# Patient Record
Sex: FEMALE | Employment: UNEMPLOYED | URBAN - METROPOLITAN AREA
[De-identification: names, ages, dates, MRNs, and addresses within clinical notes are randomized per-mention and may not be internally consistent; named-entity substitution may affect disease eponyms.]

---

## 2018-04-16 ENCOUNTER — HOSPITAL ENCOUNTER (INPATIENT)
Age: 23
LOS: 2 days | Discharge: HOME | DRG: 781 | End: 2018-04-18
Attending: PSYCHIATRY & NEUROLOGY | Admitting: STUDENT IN AN ORGANIZED HEALTH CARE EDUCATION/TRAINING PROGRAM
Payer: COMMERCIAL

## 2018-04-16 PROBLEM — F32.A DEPRESSION: Status: ACTIVE | Noted: 2018-04-16

## 2018-04-16 PROCEDURE — 65220000003 HC RM SEMIPRIVATE PSYCH

## 2018-04-16 RX ORDER — HALOPERIDOL 5 MG/ML
2 INJECTION INTRAMUSCULAR
Status: DISCONTINUED | OUTPATIENT
Start: 2018-04-16 | End: 2018-04-18 | Stop reason: HOSPADM

## 2018-04-16 RX ORDER — DIPHENHYDRAMINE HCL 25 MG
25-50 CAPSULE ORAL
Status: DISCONTINUED | OUTPATIENT
Start: 2018-04-16 | End: 2018-04-18 | Stop reason: HOSPADM

## 2018-04-16 RX ORDER — HALOPERIDOL 2 MG/1
2 TABLET ORAL
Status: DISCONTINUED | OUTPATIENT
Start: 2018-04-16 | End: 2018-04-18 | Stop reason: HOSPADM

## 2018-04-16 NOTE — IP AVS SNAPSHOT
Summary of Care Report The Summary of Care report has been created to help improve care coordination. Users with access to [employer] or [address] (Web-based application) may access additional patient information including the Discharge Summary. If you are not currently a [address] user and need more information, please call the number listed below in the [place] [address] section and ask to be connected with Medical Records. Facility Information Name Address Phone [place] [id number] [facility] [address] [phone] Patient Information Patient Name Sex  Diane Monzon ([id number]) Female 1995 Discharge Information Admitting Provider Service Area Unit Danii Rios MD / [address] / [phone] Discharge Provider Discharge Date/Time Discharge Disposition Destination (none) 2018 (Pending) AHR (none) Patient Language Language ENGLISH [13] Hospital Problems as of 2018  Never Reviewed Class Noted - Resolved Last Modified POA Active Problems * (Principal)Adjustment disorder with depressed mood  2018 - Present 2018 by Eliezer Polk MD Yes Entered by Danii Rios MD  
  Cannabis use disorder, moderate, dependence ([address] (Chronic)  2018 - Present 2018 by Eliezer Polk MD Unknown Entered by Eliezer Polk MD  
  
You are allergic to the following No active allergies Current Discharge Medication List  
  
Notice You have not been prescribed any medications. Follow-up Information Follow up With Details Comments Contact Info  
    patient released by the courts. Discharge Instructions BEHAVIORAL HEALTH NURSING DISCHARGE NOTE The following personal items collected during your admission are returned to you:  
Dental Appliance: Dental Appliances: None Vision: Visual Aid: Glasses Hearing Aid:   
Jewelry: Jewelry: Body Piercing ((2) nose ring on pt; (1) nose ring/Locker 107-1) Clothing: Clothing: Pants, Shirt, Undergarments, Socks, Footwear, With patient Other Valuables: Other Valuables: Eyeglasses, Money (comment) ($20.00 Locker 107-1) Valuables sent to safe: Personal Items Sent to Safe: None PATIENT INSTRUCTIONS:  
 
 
The discharge information has been reviewed with the patient. The patient verbalized understanding. Patient armband removed and shredded Chart Review Routing History No Routing History on File

## 2018-04-16 NOTE — IP AVS SNAPSHOT
[address] Patient: Nyasia Rossi MRN: [id number] Emily Wapello About your hospitalization You were admitted on:  April 16, 2018 You last received care in the:  [facility] You were discharged on:  April 18, 2018 Why you were hospitalized Your primary diagnosis was: Adjustment Disorder With Depressed Mood Your diagnoses also included:  Cannabis Use Disorder, Moderate, Dependence ([facility]) Follow-up Information Follow up With Details Comments Contact Info  
    patient released by the courts. Discharge Orders None A check jordyn indicates which time of day the medication should be taken. My Medications Notice You have not been prescribed any medications. Discharge Instructions BEHAVIORAL HEALTH NURSING DISCHARGE NOTE The following personal items collected during your admission are returned to you:  
Dental Appliance: Dental Appliances: None Vision: Visual Aid: Glasses Hearing Aid:   
Jewelry: Jewelry: Body Piercing ((2) nose ring on pt; (1) nose ring/Locker 107-1) Clothing: Clothing: Pants, Shirt, Undergarments, Socks, Footwear, With patient Other Valuables: Other Valuables: Eyeglasses, Money (comment) ($20.00 Locker 107-1) Valuables sent to safe: Personal Items Sent to Safe: None PATIENT INSTRUCTIONS:  
 
 
The discharge information has been reviewed with the patient. The patient verbalized understanding. Patient armband removed and shredded Introducing hospitals & HEALTH SERVICES! Stephanie Godinez introduces [employer] patient portal. Now you can access parts of your medical record, email your doctor's office, and request medication refills online. 1. In your internet browser, go to [email or web address] 2. Click on the First Time User? Click Here link in the Sign In box.  You will see the New Member Sign Up page. 3. Enter your Simplebooklet Access Code exactly as it appears below. You will not need to use this code after youve completed the sign-up process. If you do not sign up before the expiration date, you must request a new code. · Simplebooklet Access Code: [email or web address] Expires: 7/15/2018  7:13 PM 
 
4. Enter the last four digits of your Social Security Number (xxxx) and Date of Birth (mm/dd/yyyy) as indicated and click Submit. You will be taken to the next sign-up page. 5. Create a xoomparkt ID. This will be your Simplebooklet login ID and cannot be changed, so think of one that is secure and easy to remember. 6. Create a Simplebooklet password. You can change your password at any time. 7. Enter your Password Reset Question and Answer. This can be used at a later time if you forget your password. 8. Enter your e-mail address. You will receive e-mail notification when new information is available in [address] 9. Click Sign Up. You can now view and download portions of your medical record. 10. Click the Download Summary menu link to download a portable copy of your medical information. If you have questions, please visit the Frequently Asked Questions section of the Simplebooklet website. Remember, Simplebooklet is NOT to be used for urgent needs. For medical emergencies, dial 911. Now available from your iPhone and Android! Introducing Zhang Parsons As a [employer] patient, I wanted to make you aware of our electronic visit tool called Zhang Sparrowrufinomaddie. [employer] 24/7 allows you to connect within minutes with a medical provider 24 hours a day, seven days a week via a mobile device or tablet or logging into a secure website from your computer. You can access Zhang Parsons from anywhere in the [place].  
 
A virtual visit might be right for you when you have a simple condition and feel like you just dont want to get out of bed, or cant get away from work for an appointment, when your regular [facility] provider is not available (evenings, weekends or holidays), or when youre out of town and need minor care. Electronic visits cost only $49 and if the [facility] 24/7 provider determines a prescription is needed to treat your condition, one can be electronically transmitted to a nearby pharmacy*. Please take a moment to enroll today if you have not already done so. The enrollment process is free and takes just a few minutes. To enroll, please download the [employer] dustin to your tablet or phone, or visit [email or web address] to enroll on your computer. And, as an [address] [place] [address] patient with a [employer] account, the results of your visits will be scanned into your electronic medical record and your primary care provider will be able to view the scanned results. We urge you to continue to see your regular [facility] provider for your ongoing medical care. And while your primary care provider may not be the one available when you seek a 2threadsrufinofin virtual visit, the peace of mind you get from getting a real diagnosis real time can be priceless. For more information on [employer], view our Frequently Asked Questions (FAQs) at [email or web address]. Sincerely, 
 
Denice Patterson MD 
Chief Medical Officer [employer] *:  certain medications cannot be prescribed via [employer] Providers Seen During Your Hospitalization Provider Specialty Primary office phone Gavino Hickman MD Psychiatry [phone] Your Primary Care Physician (PCP) Primary Care Physician Office Phone Office Fax NONE ** None ** ** None ** You are allergic to the following No active allergies Recent Documentation Height Weight BMI  
  
  
 1.803 m 77.1 kg 23.71 kg/m2 Emergency Contacts Name Discharge Info Relation Home Work Mobile Maglais Juárez DISCHARGE CAREGIVER [3] Other Relative [6] [phone] Patient Belongings The following personal items are in your possession at time of discharge: 
  Dental Appliances: None  Visual Aid: Glasses      Home Medications: None   Jewelry: Body Piercing ((2) nose ring on pt; (1) nose ring/Locker 107-1)  Clothing: Pants, Shirt, Undergarments, Socks, Footwear, With patient    Other Valuables: Eyeglasses, Money (comment) ($20.00 Locker 107-1)  Personal Items Sent to Safe: None Please provide this summary of care documentation to your next provider. Signatures-by signing, you are acknowledging that this After Visit Summary has been reviewed with you and you have received a copy. Patient Signature:  ____________________________________________________________ Date:  ____________________________________________________________  
  
[facility] Provider Signature:  ____________________________________________________________ Date:  ____________________________________________________________

## 2018-04-16 NOTE — IP AVS SNAPSHOT
[address] Patient: Sen Gallardo MRN: [id number] Kendra Bryant A check jordyn indicates which time of day the medication should be taken. My Medications Notice You have not been prescribed any medications.

## 2018-04-17 PROBLEM — F43.21 ADJUSTMENT DISORDER WITH DEPRESSED MOOD: Status: ACTIVE | Noted: 2018-04-16

## 2018-04-17 PROBLEM — F12.20 CANNABIS USE DISORDER, MODERATE, DEPENDENCE (HCC): Chronic | Status: ACTIVE | Noted: 2018-04-17

## 2018-04-17 LAB
ALBUMIN SERPL-MCNC: 3.6 G/DL (ref 3.4–5)
ALBUMIN/GLOB SERPL: 1.1 {RATIO} (ref 0.8–1.7)
ALP SERPL-CCNC: 49 U/L (ref 45–117)
ALT SERPL-CCNC: 13 U/L (ref 13–56)
ANION GAP SERPL CALC-SCNC: 4 MMOL/L (ref 3–18)
AST SERPL-CCNC: 8 U/L (ref 15–37)
BASOPHILS # BLD: 0 K/UL (ref 0–0.1)
BASOPHILS NFR BLD: 1 % (ref 0–2)
BILIRUB SERPL-MCNC: 0.5 MG/DL (ref 0.2–1)
BUN SERPL-MCNC: 4 MG/DL (ref 7–18)
BUN/CREAT SERPL: 6 (ref 12–20)
CALCIUM SERPL-MCNC: 8.6 MG/DL (ref 8.5–10.1)
CHLORIDE SERPL-SCNC: 108 MMOL/L (ref 100–108)
CO2 SERPL-SCNC: 27 MMOL/L (ref 21–32)
CREAT SERPL-MCNC: 0.68 MG/DL (ref 0.6–1.3)
DIFFERENTIAL METHOD BLD: ABNORMAL
EOSINOPHIL # BLD: 0.1 K/UL (ref 0–0.4)
EOSINOPHIL NFR BLD: 1 % (ref 0–5)
ERYTHROCYTE [DISTWIDTH] IN BLOOD BY AUTOMATED COUNT: 13.2 % (ref 11.6–14.5)
GLOBULIN SER CALC-MCNC: 3.4 G/DL (ref 2–4)
GLUCOSE SERPL-MCNC: 85 MG/DL (ref 74–99)
HCT VFR BLD AUTO: 34.2 % (ref 35–45)
HGB BLD-MCNC: 11 G/DL (ref 12–16)
LYMPHOCYTES # BLD: 2.5 K/UL (ref 0.9–3.6)
LYMPHOCYTES NFR BLD: 50 % (ref 21–52)
MCH RBC QN AUTO: 27.4 PG (ref 24–34)
MCHC RBC AUTO-ENTMCNC: 32.2 G/DL (ref 31–37)
MCV RBC AUTO: 85.3 FL (ref 74–97)
MONOCYTES # BLD: 0.5 K/UL (ref 0.05–1.2)
MONOCYTES NFR BLD: 10 % (ref 3–10)
NEUTS SEG # BLD: 1.9 K/UL (ref 1.8–8)
NEUTS SEG NFR BLD: 38 % (ref 40–73)
PLATELET # BLD AUTO: 207 K/UL (ref 135–420)
PMV BLD AUTO: 10.3 FL (ref 9.2–11.8)
POTASSIUM SERPL-SCNC: 3.4 MMOL/L (ref 3.5–5.5)
PROT SERPL-MCNC: 7 G/DL (ref 6.4–8.2)
RBC # BLD AUTO: 4.01 M/UL (ref 4.2–5.3)
SODIUM SERPL-SCNC: 139 MMOL/L (ref 136–145)
TSH SERPL DL<=0.05 MIU/L-ACNC: 1.02 UIU/ML (ref 0.36–3.74)
WBC # BLD AUTO: 5 K/UL (ref 4.6–13.2)

## 2018-04-17 PROCEDURE — 84443 ASSAY THYROID STIM HORMONE: CPT | Performed by: STUDENT IN AN ORGANIZED HEALTH CARE EDUCATION/TRAINING PROGRAM

## 2018-04-17 PROCEDURE — 80053 COMPREHEN METABOLIC PANEL: CPT | Performed by: STUDENT IN AN ORGANIZED HEALTH CARE EDUCATION/TRAINING PROGRAM

## 2018-04-17 PROCEDURE — 85025 COMPLETE CBC W/AUTO DIFF WBC: CPT | Performed by: STUDENT IN AN ORGANIZED HEALTH CARE EDUCATION/TRAINING PROGRAM

## 2018-04-17 PROCEDURE — 74011250637 HC RX REV CODE- 250/637: Performed by: STUDENT IN AN ORGANIZED HEALTH CARE EDUCATION/TRAINING PROGRAM

## 2018-04-17 PROCEDURE — 65220000003 HC RM SEMIPRIVATE PSYCH

## 2018-04-17 PROCEDURE — 36415 COLL VENOUS BLD VENIPUNCTURE: CPT | Performed by: STUDENT IN AN ORGANIZED HEALTH CARE EDUCATION/TRAINING PROGRAM

## 2018-04-17 RX ADMIN — PRENATAL VITAMINS-IRON FUMARATE 27 MG IRON-FOLIC ACID 0.8 MG TABLET 1 TABLET: at 08:51

## 2018-04-17 NOTE — BSMART NOTE
Pt.is a 23year old pregnant female with history of Depression. Pt was admitted to this facility for suicidal attempt via overdose. SW Contact: SW met with pt to discuss d/c planning. Pt. admits she overdosed on pills. Pt stated she has been stressed  About being pregnant. Pt stated the father of the baby and her both have talked about a possible . Pt. Stated she is not sure if she wants to have a child at this time in her life. Pt. Stated  she has miscarried in the past.  Pt stated but with this pregnancy , she is not sure she wants a child. Pt. admits she was not thinking clearly the day she overdosed. Pt stated today se is feeling better now that she has been able to talk. Pt. Rell Eliascorie she has suffered from depression since 13years of age. Pt sated she attempted to harm self at that age. Pt. Also admits to  self harm attempt 4 years ago. SW discussed continued safety as a goal of pt. SW also provided pt with mental health and SA education. Pt. Stated she will attend therapy but is not sure about medications at this time. SW will refer pt to  mental health counseling. SW discussed self-efficacy, positive coping skills,safety plan, crisis and suicide hotline in addition to the benefits of counseling. Pt stated she is willing to go to counseling. Pt. Is currently denying ideations at this time. Pt. Has a flat affect, sad and lacks insight.

## 2018-04-17 NOTE — PROGRESS NOTES
Patient seemed depressed, affect flat. States she is ok. Denies suicidal thoughts. Briefly talked about her pregnancy, states \" I'm not going to keep it\". 1:1 offered for emotional support but did not want to talk. Did not wanted to attend group but did express she wanted to talk with doctor.

## 2018-04-17 NOTE — BH NOTES
GROUP THERAPY PROGRESS NOTE    Lyndsay Liu is participating in [place].      Group time: 30 minutes    Personal goal for participation: use the phone    Goal orientation: community    Group therapy participation: minimal    Therapeutic interventions reviewed and discussed: goals and procedures    Impression of participation: encouraged

## 2018-04-17 NOTE — BH NOTES
Patient TDO to unit, escorted by a  and a hospital . Patient alert and oriented, contracted for safety, cooperative with admission process, patient rights was explained and patient verbalized understanding of rights. Patient stated that she understand the reason for further acute treatment due to her ingestion of too many medications for suicide attempt. Patient stated that she has made up her mind to participate in the treatment. Patient was encouraged to participate fully in treatment, verbalize to staff if suicidal thoughts towards self or others arise, she verbalized understanding. Patient received unit orientation and tour, patient remained in hospital gowns, non slip socks provided to patient. Patient ate 100% snacks then returned to her room.  Every 15 minutes rounding for safety continues

## 2018-04-17 NOTE — BSMART NOTE
SOCIAL WORK GROUP THERAPY PROGRESS NOTE    Group Time:  10am    Group Topic:  Coping Skills    Group Participation:      Pt expressed not interested in attending session despite staff support

## 2018-04-17 NOTE — H&P
[address] [facility] [address]  Inpatient Admission Note    Date of Service:  18    Historian(s): Casey Eng and chart review  Referral Source: TDO    Chief Complaint   \"I overdosed on ibuprofen or tylenol. It was only one of them. \"      History of Present Illness     Casey Eng is a 23 y.o.  female with a history of depression and who is currently 4-5 weeks' pregnant who presented for inpatient psychiatric hospitalization under TDO after attempting suicide via ingestion. On initial assessment, the pt states she OD on either Tylenol or ibuprofen on 2018 due to the stress of learning she is pregnant and needing to have an . She notes about 3 years ago, she was pregnant, wanted to keep that child but miscarried. When she learned she was pregnant recently and will likely have an , it engendered memories from her previous pregnancy. The pt became tearful and needed several minutes to gather herself. Today, she reports attempting to OD was a mistake. She reports being glad to be alive. She feels she hurt her friends and family with this overdose attempt. She reports feeling depressed on and off since the age of 5-8yo. Most recently, she denies being depressed prior to this pregnancy. She took Celexa in the past for depression but reports that caused paranoia. She shared protective factors against suicide include work, Caodaism, her mother, friends and her Levy Ramires. External supports include God, her mother, best friends and the father of her unborn child. She denies access to and ownership of weapons. She denies SI, HI and AVH. Psychiatric Review of Systems   Depression:  Endorses depressed mood, with tearfulness and guilty. She denies anhedonia, hopelessness, helplessness and worthlessness. Energy is adequate. Denied any thoughts of self-harm or suicidal ideation.     Anxiety: Denied any excessive worrying, social anxiety, panic attacks and OCD. Irritability: Denied low threshold of frustration or anger. Bipolar symptoms: Denied history of decreased need for sleep associated with increased energy, racing thoughts, rapid speech and risky behavior. Abuse/Trauma/PTSD: Denied history of verbal, physical or sexual abuse. Denies avoidant behavior related to trauma triggers, flashbacks, hypervigilance or nightmares. Psychosis: Denied auditory/visual hallucinations or delusions. Medical Review of Systems     Sleep: stable and adequate  Appetite: stable and adequate    10 point review of systems was completed. Significant findings are found in the HPI or MSE. Psychiatric Treatment History     Self-injurious behavior/risky thoughts or behaviors (past suicidal ideation/attempt): Endorses suicide attempt by overdose 3 years ago when she miscarried her first child. Violence/Risk to others (past homicidal ideation/attempt):    Denies any prior history of violence or homicidal ideation. Previous psychiatric medication trials: Celexa - cause paranoia    Previous psychiatric hospitalizations: Endorses 3 years ago with miscarriage of her first child. Current therapist: Denies   - States she had a therapist in the past but it was not helpful. Current psychiatric provider: Denies    Allergies    No Known Allergies    Medical History     No past medical history on file.     History of neurological illness: Denies  History of head injuries: Denies     Medication(s)     None         Substance Abuse History     Tobacco: denied  Alcohol: denied  Marijuana: smokes marijuana daily  Cocaine: denied  Opiate: denied  Benzodiazepine: denied  Other: denied    Consequences: none    History of detox: none    History of substance abuse treatment: none    Family History     Medical Family History  Maternal: HTN, Cardiac Disease  Paternal: CVA, high cholesterol, and HTN    Psychiatric Family History  Maternal: Depression  Paternal: Denies    Family history of suicide? NO    Social History     Living Situation: Lives alone    Employment: gainfully employed     Education: has one year of college       Relationships/Children: no children, not , not in a relationship    Legal: Denies    Spirituality/Restorationism: Mormonism    Vitals/Labs      Vitals:    04/16/18 1942 04/17/18 0831   BP: 122/83 115/80   Pulse: 93 81   Resp: 19 17   Temp: 98.8 °F (37.1 °C) 98.7 °F (37.1 °C)   Weight: 77.1 kg (170 lb)    Height: 5' 11\" (1.803 m)        Labs:   Results for orders placed or performed during the hospital encounter of 04/16/18   CBC WITH AUTOMATED DIFF   Result Value Ref Range    WBC 5.0 4.6 - 13.2 K/uL    RBC 4.01 (L) 4.20 - 5.30 M/uL    HGB 11.0 (L) 12.0 - 16.0 g/dL    HCT 34.2 (L) 35.0 - 45.0 %    MCV 85.3 74.0 - 97.0 FL    MCH 27.4 24.0 - 34.0 PG    MCHC 32.2 31.0 - 37.0 g/dL    RDW 13.2 11.6 - 14.5 %    PLATELET 923 501 - 970 K/uL    MPV 10.3 9.2 - 11.8 FL    NEUTROPHILS 38 (L) 40 - 73 %    LYMPHOCYTES 50 21 - 52 %    MONOCYTES 10 3 - 10 %    EOSINOPHILS 1 0 - 5 %    BASOPHILS 1 0 - 2 %    ABS. NEUTROPHILS 1.9 1.8 - 8.0 K/UL    ABS. LYMPHOCYTES 2.5 0.9 - 3.6 K/UL    ABS. MONOCYTES 0.5 0.05 - 1.2 K/UL    ABS. EOSINOPHILS 0.1 0.0 - 0.4 K/UL    ABS. BASOPHILS 0.0 0.0 - 0.1 K/UL    DF AUTOMATED     METABOLIC PANEL, COMPREHENSIVE   Result Value Ref Range    Sodium 139 136 - 145 mmol/L    Potassium 3.4 (L) 3.5 - 5.5 mmol/L    Chloride 108 100 - 108 mmol/L    CO2 27 21 - 32 mmol/L    Anion gap 4 3.0 - 18 mmol/L    Glucose 85 74 - 99 mg/dL    BUN 4 (L) 7.0 - 18 MG/DL    Creatinine 0.68 0.6 - 1.3 MG/DL    BUN/Creatinine ratio 6 (L) 12 - 20      GFR est AA >60 >60 ml/min/1.73m2    GFR est non-AA >60 >60 ml/min/1.73m2    Calcium 8.6 8.5 - 10.1 MG/DL    Bilirubin, total 0.5 0.2 - 1.0 MG/DL    ALT (SGPT) 13 13 - 56 U/L    AST (SGOT) 8 (L) 15 - 37 U/L    Alk.  phosphatase 49 45 - 117 U/L    Protein, total 7.0 6.4 - 8.2 g/dL    Albumin 3.6 3.4 - 5.0 g/dL Globulin 3.4 2.0 - 4.0 g/dL    A-G Ratio 1.1 0.8 - 1.7     TSH 3RD GENERATION   Result Value Ref Range    TSH 1.02 0.36 - 3.74 uIU/mL       Mental Status Examination     Appearance/Hygiene 23 y.o. AAF with bright pinkish-red dyed short hair and a nose ring   Behavior/Social Relatedness Appropriate  Relatedness is appropriate   Musculoskeletal Gait/Station: appropriate  Tone (flaccid, cogwheeling, spastic): not assessed  Psychomotor (hyperkinetic, hypokinetic): appropriate   Involuntary movements (tics, dyskinesias, akathisia, stereotypies): none   Speech   Rate, rhythm, volume, fluency and articulation are appropriate   Mood   sad   Affect    sad   Thought Process Linear and goal directed    Vagueness, incoherence, circumstantiality, tangentiality, neologisms, perseveration, flight of ideas, or self-contradictory statements not present on assessment   Thought Content and Perceptual Disturbances Denies delusions, ideas of reference, overvalued ideas, ruminations, obsession, compulsions, and phobias    Denies self-injurious behavior (SIB), suicidal ideation (SI), aggressive behavior or homicidal ideation (HI)    Denies auditory and visual hallucinations   Sensorium and Cognition  AOx4, attention intact, memory intact, language use appropriate, and fund of knowledge age appropriate   Insight  poor   Judgment poor       Suicide Risk Assessment     Admission  Date/Time: 04/17/18    [x] Admission  [] Discharge     Key Factors:   Current admission precipitated by suicide attempt?   [x]  Yes     2    []  No     1     Suicide Attempt History  [x] Past attempts of high lethality    2 []  Past attempts of low lethality    1 []  No previous attempts       0   Suicidal Ideation []  Constant suicidal thoughts      2 []  Intermittent or fleeting suicidal  thoughts  1 [x]  Denies current suicidal thoughts    0   Suicide Plan   []  Has plan with actual OR potential access to planned method    2 []  Has plan without access to planned method      1 [x]  No plan            0   Plan Lethality []  Highly lethal plan (Carbon monoxide, gun, hanging, jumping)    2 []  Moderate lethality of plan          1 [x]  Low lethality of plan (biting, head banging, superficial scratching, pillow over face)  0   Safety Plan Agreement  []  Unwilling OR unable to agree due to impaired reality testing   2   []  Patient is ambivalent and/or guarded      1 [x]  Reliably agrees        0   Current Morbid Thoughts (reunion fantasies, preoccupations with death) []  Constantly     2     []  Frequently    1 [x]  Rarely    0   Elopement Risk  []  High risk     2 []  Moderate risk    1 [x]   Low risk    0   Symptoms    []  Hopeless  []  Helpless  []  Anhedonia   []  Guilt/shame  []  Anger/rage  []  Anxiety  []  Insomnia   []  Agitation   [x]  Impulsivity  []  5-6 symptoms present    2 []  3-4 symptoms present    1  [x]  0-2 symptoms present    0     Total Score: 4  --------------------------------------------------------------------------------------------------------------  Subjective Appraisal of Risk:  []  Patient replies not trustworthy: several non-verbal cues. []  Patient replies questionable: trustworthy: at least 1 non-verbal cue. [x]  Patient replies appear trustworthy.     Protective measures (select all that apply):  [x]  Successful past responses to stress  [x]  Spiritual/Mu-ism beliefs  [x]  Capacity for reality testing  []  Positive therapeutic relationships  [x]  Social supports/connections  []  Positive coping skills  [x]  Frustration tolerance/optimism  []  Children or pets in the home  []  Sense of responsibility to family  [x]  Agrees to treatment plan and follow up    High Risk Diagnoses (select all that apply):  [x]  Depression/Bipolar Disorder  [x]  Dual Diagnosis  []  Cardiovascular Disease  []  Schizophrenia  []  Chronic Pain  []  Epilepsy  []  Cancer  []  Personality Disorder  []  HIV/AIDS  []  Multiple Sclerosis    Dangerousness Assessment (Suicide, homicide, property destruction. ..)    Risk Factors reviewed and risk assessed to be:  [] low  [] low-moderate  [x] moderate   [] moderate-high  [] high     Protection factors reviewed and risk assessed to be:  [x] low  [] low-moderate  [] moderate   [] moderate-high  [] high     Response to treatment and risk assessed to be:  [] low  [x] low-moderate  [] moderate   [] moderate-high  [] high     Support reviewed and risk assessed to be:  [x] low  [] low-moderate  [] moderate   [] moderate-high  [] high     Acceptance of Discharge and outpatient treatment reviewed and risk assessed to be:    [x] low  [] low-moderate  [] moderate   [] moderate-high  [] high   Overall risk assessed to be:  [] low  [x] low-moderate  [] moderate   [] moderate-high  [] high       Assessment and Plan     Psychiatric Diagnoses:   Patient Active Problem List   Diagnosis Code    Adjustment disorder with depressed mood F43.21    Cannabis use disorder, moderate, dependence ([facility] [address] F12.20       Medical Diagnoses: 4-5 weeks' pregnant    Psychosocial and contextual factors:    1. Pregnancy   2. Past miscarriage about 3 years ago. Level of impairment/disability: Severe     Lyndsay Liu is a 23 y. o. who is currently requiring acute stabilization after attempting to kill herself in the context of learning she will have to abort her current pregnancy as she is not prepared for a child. Her current pregnancy caused her to think about her first pregnancy which she wanted to keep but miscarried. Pt requires inpatient psychiatric hospitalization due to recent suicide attempt and development of a safety plan. Pt does not want to take medications at this time due to her past experience of developing paranoia with her Celexa. 1. Admit to locked inpatient behavioral health unit. Start milieu, group, art and occupation therapy. 2. No medications at this time. 3. Consider OB-GYN consult  4.  Routine labs ordered and reviewed by this provider. 5. Reviewed instructions, risks, benefits and side effects. 6. Start disposition planning; verify upcoming outpatient appointments with therapist and/or psychiatric medication prescriber.    7. Tentative date of discharge: 3-5 days       Zion Sandhu MD  [id number]

## 2018-04-17 NOTE — H&P
Psychiatry History and Physical    Subjective:     Date of Evaluation:  4/17/2018    Reason for Referral:  Zo Le was referred to the examiners from /[facility] for OD. History of Presenting Problem: 23Y/O AA female in NAD well developed and nourished. TDO admit. Reports she is not suicidal now. Prior Suicide attempt 3-4 years ago. Medical problems; Depression, 4 weeks pregnant. Records show she took 15 Motrin and 15 Tylenol. Patient Active Problem List    Diagnosis Date Noted    Depression 04/16/2018     No past medical history on file. No past surgical history on file. No family history on file. Social History   Substance Use Topics    Smoking status: Not on file    Smokeless tobacco: Not on file    Alcohol use Not on file     Prior to Admission medications    Not on File     No Known Allergies     Review of Systems - History obtained from chart review and the patient  General ROS: negative  Psychological ROS: positive for - depression  Respiratory ROS: no cough, shortness of breath, or wheezing  Cardiovascular ROS: no chest pain or dyspnea on exertion  Gastrointestinal ROS: no abdominal pain, change in bowel habits, or black or bloody stools  Genito-Urinary ROS: no dysuria, trouble voiding, or hematuria  Musculoskeletal ROS: negative  Neurological ROS: no TIA or stroke symptoms  Dermatological ROS: negative  Has old lac to LUE      Objective:     No data found.       Mental Status exam: WNL except for    Sensorium  oriented to time, place and person   Orientation person, place, time/date and situation   Relations cooperative   Eye Contact appropriate   Appearance:  age appropriate and within normal Limits   Motor Behavior:  gait stable and within normal limits   Speech:  normal volume   Vocabulary average   Thought Process: logical   Thought Content free of delusions and free of hallucinations   Suicidal ideations no plan  and no intention   Homicidal ideations no plan  and no intention   Mood:  depressed   Affect:  depressed   Memory recent  adequate   Memory remote:  adequate   Concentration:  adequate   Abstraction:  concrete   Insight:  good   Reliability good   Judgment:  good         Physical Exam:   Visit Vitals    /83 (BP 1 Location: Right arm, BP Patient Position: Sitting)    Pulse 93    Temp 98.8 °F (37.1 °C)    Resp 19    Ht 5' 11\" (1.803 m)    Wt 77.1 kg (170 lb)    BMI 23.71 kg/m2     General appearance: alert, cooperative, no distress, appears stated age  Head: Normocephalic, without obvious abnormality, atraumatic  Eyes: negative  Ears: normal TM's and external ear canals AU  Nose: Nares normal. Septum midline. Mucosa normal. No drainage or sinus tenderness. Throat: Lips, mucosa, and tongue normal. Teeth and gums normal  Neck: supple, symmetrical, trachea midline, no adenopathy, thyroid: not enlarged, symmetric, no tenderness/mass/nodules, no carotid bruit and no JVD  Back: symmetric, no curvature. ROM normal. No CVA tenderness. Lungs: clear to auscultation bilaterally  Heart: regular rate and rhythm, S1, S2 normal, no murmur, click, rub or gallop  Abdomen: soft, non-tender. Bowel sounds normal. No masses,  no organomegaly  Extremities: extremities normal, atraumatic, no cyanosis or edema  Pulses: 2+ and symmetric  Skin: Skin color, texture, turgor normal. No rashes or lesions or OLD SUPERFICIAL LAC TO LUE  Lymph nodes: Cervical, supraclavicular, and axillary nodes normal.  Neurologic: Grossly normal        Impression:      Active Problems:    Depression (4/16/2018)          Plan:     Recommendations for Treatment/Conditions:  Psychiatric treatment recommended while in hospital  Admit to Psych services for OD                                                  Depression                                                  Pregnant-4 weeks    Referral To: Inpatient psychiatric care    Competency Statement:    It is recommended that the family or other concerned individuals be consulted for substituted judgement if deemed incompetent.  [email or web address]      [employer], NP   4/17/2018 8:07 AM

## 2018-04-18 VITALS
HEART RATE: 87 BPM | BODY MASS INDEX: 23.8 KG/M2 | WEIGHT: 170 LBS | HEIGHT: 71 IN | TEMPERATURE: 97.7 F | RESPIRATION RATE: 17 BRPM | SYSTOLIC BLOOD PRESSURE: 108 MMHG | DIASTOLIC BLOOD PRESSURE: 68 MMHG

## 2018-04-18 NOTE — PROGRESS NOTES
Problem: Suicide/Homicide (Adult/Pediatric)  Goal: *STG: Remains safe in hospital  Outcome: Progressing Towards Goal  Patient demonstrated safe behavior throughout shift. Goal: *STG: Attends activities and groups  Outcome: Not Progressing Towards Goal  Patient did not attend evening group, but was encouraged. Comments: Patient is alert and oriented x 4, pleasant upon approach, flat affect, \"calm\" mood, denied thoughts of harm to self or others, denied hallucinations. Patient stated that the stress of having a miscarriage about 3 years ago and now being pregnant. ..was \"too much. \" Patient stated that she plans to have \"an \" and will not tell her parents that she is pregnant. Patient has been isolated to room except for dinner, visit with family, and evening snack; voiced that she completed personal hygiene care and slept \"okay\" last night; will monitor and maintain safe environment.

## 2018-04-18 NOTE — BH NOTES
GROUP THERAPY PROGRESS NOTE    Lyndsay Liu is participating in [employer].      Group time: 30 minutes    Personal goal for participation: get discharged    Goal orientation: community    Group therapy participation: minimal    Therapeutic interventions reviewed and discussed: goals and prcedures    Impression of participation: encouraged

## 2018-04-18 NOTE — DISCHARGE INSTRUCTIONS
BEHAVIORAL HEALTH NURSING DISCHARGE NOTE      The following personal items collected during your admission are returned to you:   Dental Appliance: Dental Appliances: None  Vision: Visual Aid: Glasses  Hearing Aid:    Murlean Kay: Murlean Kay: Body Piercing ((2) nose ring on pt; (1) nose ring/Locker 107-1)  Clothing: Clothing: Pants, Shirt, Undergarments, Socks, Footwear, With patient  Other Valuables: Other Valuables: Eyeglasses, Money (comment) ($20.00 Locker 107-1)  Valuables sent to safe: Personal Items Sent to Safe: None      PATIENT INSTRUCTIONS:       The discharge information has been reviewed with the patient. The patient verbalized understanding.         Patient armband removed and shredded

## 2018-04-18 NOTE — DISCHARGE SUMMARY
[facility]  Inpatient Psychiatry   Discharge Summary     Admit date: 2018    Discharge date and time: 2018  9:06 AM    Discharge Physician: Sheryle Moccasin, MD    DISCHARGE DIAGNOSES     Psychiatric Diagnoses:        Patient Active Problem List   Diagnosis Code    Adjustment disorder with depressed mood F43.21    Cannabis use disorder, moderate, dependence ([facility] [address] F12.20         Medical Diagnoses: 4-5 weeks' pregnant     Psychosocial and contextual factors:                         1.  Pregnancy                        2.  Past miscarriage about 3 years ago. [id number] Guido presented to the inpatient unit for inpatient psychiatric hospitalization under TDO after attempting suicide via ingestion.       On initial assessment, the pt states she OD on either Tylenol or ibuprofen on 2018 due to the stress of learning she is pregnant and needing to have an . She notes about 3 years ago, she was pregnant, wanted to keep that child but miscarried. When she learned she was pregnant recently and will likely have an , it engendered memories from her previous pregnancy. The pt became tearful and needed several minutes to gather herself. She reports attempting to OD was a mistake. She reports being glad to be alive. She feels she hurt her friends and family with this overdose attempt. She shared protective factors against suicide include work, [employer], her mother, friends and her Fayne Conn. External supports include God, her mother, best friends and the father of her unborn child. She denies access to and ownership of weapons. Pt stated she did not want to take any psychotropic medications. The pt was not a behavioral concern while hospitalized. She attended some groups, was medication compliant and behaviorally appropriate. Pt denied medication side effects including TD, EPS, akathisia and mood derangements.       On 2018 the pt was released from court. DISPOSITION/FOLLOW-UP     Disposition: home    Follow-up Appointments:  Pt. Can follow-up with [address] [address]      MEDICATION CHANGES   Outpatient medications:  No current facility-administered medications on file prior to encounter. No current outpatient prescriptions on file prior to encounter. Medications discontinued during hospitalization:  Medications Discontinued During This Encounter   Medication Reason    prenatal vit-iron fumarate-fa tablet 1 Tab Patient Discharge    diphenhydrAMINE (BENADRYL) capsule 25-50 mg Patient Discharge    haloperidol lactate (HALDOL) injection 2 mg Patient Discharge    haloperidol (HALDOL) tablet 2 mg Patient Discharge         Discharged medication:  There are no discharge medications for this patient. Instructions, risks (black box warning), benefits and side effects (EPS, TD, NMS) were discussed in detail prior to discharge. Patient denied any adverse medication side effects prior to discharge. LABS/IMAGING DURING ADMISSION     Results for orders placed or performed during the hospital encounter of 04/16/18   CBC WITH AUTOMATED DIFF   Result Value Ref Range    WBC 5.0 4.6 - 13.2 K/uL    RBC 4.01 (L) 4.20 - 5.30 M/uL    HGB 11.0 (L) 12.0 - 16.0 g/dL    HCT 34.2 (L) 35.0 - 45.0 %    MCV 85.3 74.0 - 97.0 FL    MCH 27.4 24.0 - 34.0 PG    MCHC 32.2 31.0 - 37.0 g/dL    RDW 13.2 11.6 - 14.5 %    PLATELET 295 060 - 014 K/uL    MPV 10.3 9.2 - 11.8 FL    NEUTROPHILS 38 (L) 40 - 73 %    LYMPHOCYTES 50 21 - 52 %    MONOCYTES 10 3 - 10 %    EOSINOPHILS 1 0 - 5 %    BASOPHILS 1 0 - 2 %    ABS. NEUTROPHILS 1.9 1.8 - 8.0 K/UL    ABS. LYMPHOCYTES 2.5 0.9 - 3.6 K/UL    ABS. MONOCYTES 0.5 0.05 - 1.2 K/UL    ABS. EOSINOPHILS 0.1 0.0 - 0.4 K/UL    ABS.  BASOPHILS 0.0 0.0 - 0.1 K/UL    DF AUTOMATED     METABOLIC PANEL, COMPREHENSIVE   Result Value Ref Range    Sodium 139 136 - 145 mmol/L    Potassium 3.4 (L) 3.5 - 5.5 mmol/L    Chloride 108 100 - 108 mmol/L    CO2 27 21 - 32 mmol/L    Anion gap 4 3.0 - 18 mmol/L    Glucose 85 74 - 99 mg/dL    BUN 4 (L) 7.0 - 18 MG/DL    Creatinine 0.68 0.6 - 1.3 MG/DL    BUN/Creatinine ratio 6 (L) 12 - 20      GFR est AA >60 >60 ml/min/1.73m2    GFR est non-AA >60 >60 ml/min/1.73m2    Calcium 8.6 8.5 - 10.1 MG/DL    Bilirubin, total 0.5 0.2 - 1.0 MG/DL    ALT (SGPT) 13 13 - 56 U/L    AST (SGOT) 8 (L) 15 - 37 U/L    Alk. phosphatase 49 45 - 117 U/L    Protein, total 7.0 6.4 - 8.2 g/dL    Albumin 3.6 3.4 - 5.0 g/dL    Globulin 3.4 2.0 - 4.0 g/dL    A-G Ratio 1.1 0.8 - 1.7     TSH 3RD GENERATION   Result Value Ref Range    TSH 1.02 0.36 - 3.74 uIU/mL        Admission MENTAL STATUS EVALUATION     Appearance/Hygiene 23 y.o.  AAF with bright pinkish-red dyed short hair and a nose ring   Behavior/Social Relatedness Appropriate  Relatedness is appropriate   Musculoskeletal Gait/Station: appropriate  Tone (flaccid, cogwheeling, spastic): not assessed  Psychomotor (hyperkinetic, hypokinetic): appropriate   Involuntary movements (tics, dyskinesias, akathisia, stereotypies): none   Speech                          Rate, rhythm, volume, fluency and articulation are appropriate   Mood                          sad   Affect                                                   sad   Thought Process Linear and goal directed     Vagueness, incoherence, circumstantiality, tangentiality, neologisms, perseveration, flight of ideas, or self-contradictory statements not present on assessment   Thought Content and Perceptual Disturbances Denies delusions, ideas of reference, overvalued ideas, ruminations, obsession, compulsions, and phobias     Denies self-injurious behavior (SIB), suicidal ideation (SI), aggressive behavior or homicidal ideation (HI)     Denies auditory and visual hallucinations   Sensorium and Cognition              AOx4, attention intact, memory intact, language use appropriate, and fund of knowledge age appropriate   Insight              poor   Judgment poor        SUICIDE RISK ASSESSMENT     [] Admission  [x] Discharge     Key Factors:   Current admission precipitated by suicide attempt?   [x]  Yes     2    []  No     1     Suicide Attempt History  [x] Past attempts of high lethality    2 []  Past attempts of low lethality    1 []  No previous attempts       0   Suicidal Ideation []  Constant suicidal thoughts      2 []  Intermittent or fleeting suicidal  thoughts  1 [x]  Denies current suicidal thoughts    0   Suicide Plan   []  Has plan with actual OR potential access to planned method    2 []  Has plan without access to planned method      1 [x]  No plan            0   Plan Lethality []  Highly lethal plan (Carbon monoxide, gun, hanging, jumping)    2 []  Moderate lethality of plan          1 [x]  Low lethality of plan (biting, head banging, superficial scratching, pillow over face)  0   Safety Plan Agreement  []  Unwilling OR unable to agree due to impaired reality testing   2   []  Patient is ambivalent and/or guarded      1 [x]  Reliably agrees        0   Current Morbid Thoughts (reunion fantasies, preoccupations with death) []  Constantly     2     []  Frequently    1 [x]  Rarely    0   Elopement Risk  []  High risk     2 []  Moderate risk    1 [x]   Low risk    0   Symptoms    []  Hopeless  []  Helpless  []  Anhedonia   []  Guilt/shame  []  Anger/rage  []  Anxiety  []  Insomnia   []  Agitation   []  Impulsivity  []  5-6 symptoms present    2 []  3-4 symptoms present    1  [x]  0-2 symptoms present    0     Scoring Key:  10 or higher = Imminent Risk (consider 1:1)  4 - 9 = Moderate Risk (consider q 15 minute observation)Attended alcohol, tobacco, prescription and other drug psychoeducation group.   0 - 3 = Low Risk (consider q 30 minute observation)    Total Score: 4  ------------------------------------------------------------------------------------------------------------------  PLEASE ADDRESS THE FOLLOWING 5 ISSUES     Physician's Subjective Appraisal of Risk (check one):  []  Patient replies not trustworthy: several non-verbal cues. []  Patient replies questionable: trustworthy: at least 1 non-verbal cue. [x]  Patient replies appear trustworthy. Family History of Suicide? []  Yes  [x]  No    Protective measures (select all that apply):  [x]  Successful past responses to stress  [x]  Spiritual/Shinto beliefs  [x]  Capacity for reality testing  [x]  Positive therapeutic relationships  [x]  Social supports/connections  [x]  Positive coping skills  [x]  Frustration tolerance/optimism  []  Children or pets in the home  [x]  Sense of responsibility to family  [x]  Agrees to treatment plan and follow up    Others (list):    High Risk Diagnoses (select all that apply):  [x]  Depression/Bipolar Disorder  [x]  Dual Diagnosis  []  Cardiovascular Disease  []  Schizophrenia  []  Chronic Pain  []  Epilepsy  []  Cancer  []  Personality Disorder  []  HIV/AIDS  []  Multiple Sclerosis    Dangerousness Assessment (Suicide, homicide, property destruction. ..)    Risk Factors reviewed and risk assessed to be:  [] low  [] low-moderate  [x] moderate   [] moderate-high  [] high     Protection factors reviewed and risk assessed to be:  [x] low  [] low-moderate  [] moderate   [] moderate-high  [] high     Response to treatment and risk assessed to be:  [] low  [x] low-moderate  [] moderate   [] moderate-high  [] high     Support reviewed and risk assessed to be:  [x] low  [] low-moderate  [] moderate   [] moderate-high  [] high     Acceptance of Discharge and outpatient treatment reviewed and risk assessed to be:    [x] low  [] low-moderate  [] moderate   [] moderate-high  [] high   Overall risk assessed to be:  [x] low  [x] low-moderate  [] moderate   [] moderate-high  [] high     Completion of discharge was greater than 30 minutes.  Over 50% of today's discharge was geared towards counseling and coordination of care.           Shiv Gutiérrez MD  Psychiatry  [facility]